# Patient Record
Sex: FEMALE | Race: WHITE
[De-identification: names, ages, dates, MRNs, and addresses within clinical notes are randomized per-mention and may not be internally consistent; named-entity substitution may affect disease eponyms.]

---

## 2017-10-20 ENCOUNTER — TELEPHONE (OUTPATIENT)
Dept: CASE MANAGEMENT | Age: 47
End: 2017-10-20

## 2017-10-20 NOTE — TELEPHONE ENCOUNTER
Referral from [facility] for possible pulm nodule. Pt called and VM message requesting return call.

## 2017-11-07 ENCOUNTER — TELEPHONE (OUTPATIENT)
Dept: CASE MANAGEMENT | Age: 47
End: 2017-11-07

## 2017-11-07 NOTE — TELEPHONE ENCOUNTER
Spoke with pt regarding possible pulmonary nodule and she lives part of the time in [place] and part of the time in [facility]. She is getting ready to leave [place] in two weeks. Pt request that I send her report so she can discuss with Dr Miri Fong when she gets back to [facility]. Report mailed today.